# Patient Record
Sex: MALE | Race: WHITE | NOT HISPANIC OR LATINO | Employment: FULL TIME | URBAN - NONMETROPOLITAN AREA
[De-identification: names, ages, dates, MRNs, and addresses within clinical notes are randomized per-mention and may not be internally consistent; named-entity substitution may affect disease eponyms.]

---

## 2024-10-07 ENCOUNTER — OFFICE VISIT (OUTPATIENT)
Dept: URGENT CARE | Facility: CLINIC | Age: 39
End: 2024-10-07
Payer: COMMERCIAL

## 2024-10-07 ENCOUNTER — APPOINTMENT (OUTPATIENT)
Dept: RADIOLOGY | Facility: CLINIC | Age: 39
End: 2024-10-07
Payer: COMMERCIAL

## 2024-10-07 VITALS
SYSTOLIC BLOOD PRESSURE: 142 MMHG | DIASTOLIC BLOOD PRESSURE: 100 MMHG | RESPIRATION RATE: 18 BRPM | TEMPERATURE: 98.2 F | OXYGEN SATURATION: 95 % | HEART RATE: 79 BPM

## 2024-10-07 DIAGNOSIS — Z75.8 DOES NOT HAVE PRIMARY CARE PROVIDER: ICD-10-CM

## 2024-10-07 DIAGNOSIS — R06.2 WHEEZING: ICD-10-CM

## 2024-10-07 DIAGNOSIS — R06.2 WHEEZING: Primary | ICD-10-CM

## 2024-10-07 DIAGNOSIS — J20.9 ACUTE BRONCHITIS, UNSPECIFIED ORGANISM: ICD-10-CM

## 2024-10-07 PROCEDURE — 99203 OFFICE O/P NEW LOW 30 MIN: CPT

## 2024-10-07 PROCEDURE — 71046 X-RAY EXAM CHEST 2 VIEWS: CPT

## 2024-10-07 RX ORDER — ALBUTEROL SULFATE 90 UG/1
2 INHALANT RESPIRATORY (INHALATION) EVERY 6 HOURS PRN
Qty: 8.5 G | Refills: 0 | Status: SHIPPED | OUTPATIENT
Start: 2024-10-07

## 2024-10-07 RX ORDER — AZITHROMYCIN 250 MG/1
TABLET, FILM COATED ORAL
Qty: 6 TABLET | Refills: 0 | Status: SHIPPED | OUTPATIENT
Start: 2024-10-07 | End: 2024-10-11

## 2024-10-07 RX ORDER — METHYLPREDNISOLONE 4 MG
TABLET, DOSE PACK ORAL
Qty: 21 TABLET | Refills: 0 | Status: SHIPPED | OUTPATIENT
Start: 2024-10-07

## 2024-10-07 NOTE — PATIENT INSTRUCTIONS
"Take prescribed medication as instructed.  Tylenol for pain or fever.  Avoid NSAIDs while taking steroid pack (Medrol).  Make sure to stay well-hydrated and rest.  Warm tea with honey/teaspoon of honey may help with coughing irritation.  Follow-up with family doctor referral as discussed.  Make sure to brush teeth after inhaler use.  Follow up with PCP in 3-5 days.  Proceed to  ER if symptoms worsen.    If tests are performed, our office will contact you with results only if changes need to made to the care plan discussed with you at the visit. You can review your full results on [facility].  Patient Education     Acute bronchitis in adults   The Basics   Written by the doctors and editors at [facility]   What is bronchitis? -- This is an infection that causes a cough. It happens when the tubes that carry air into the lungs, called the \"bronchi,\" get infected (figure 1).  Usually, bronchitis happens after a person gets a cold or the flu. The viruses that cause the cold or flu infect the bronchi and irritate them. Antibiotics do not help bronchitis.  Bronchitis can also happen when a person gets an infection called \"whooping cough,\" but this is much less common. Whooping cough is caused by bacteria that can infect the bronchi. Most people get vaccines to prevent whooping cough, but the vaccine doesn't always work. Your doctor will be able to tell if you have whooping cough by doing an exam and listening to your cough.  This article is about \"acute\" bronchitis. This is different from \"chronic\" bronchitis, which is a lung disease that most often affects people who smoke.  What are the symptoms of bronchitis? -- The most common symptoms are:   A cough that can last up to a few weeks   Coughing up mucus that is clear, yellow, or green - Green mucus does not always mean that you have a bacterial infection.  You might also have other cold or flu symptoms, like a stuffy nose, sore throat, or headache. People with " bronchitis do not usually get a fever.  Will I need tests? -- Most people with bronchitis do not need a test. But if your doctor or nurse is not sure what is causing your cough, they might do tests. For example, they might order a chest X-ray. Or if they think that you might have COVID-19, they will test you for the virus that causes the infection.  How is bronchitis treated? -- Bronchitis almost always goes away on its own. But the cough can take up to 3 weeks to get better, and sometimes even longer.  Doctors do not usually treat bronchitis with antibiotic medicines. That's because bronchitis is usually caused by a virus, and antibiotics kill bacteria, not viruses. Also, antibiotics can actually cause other problems.  To feel better, you can treat your cold and flu symptoms. You can:   Get lots of rest, and drink plenty of liquids.   Drink hot tea.   Suck on cough drops or hard candy.   Take over-the-counter cough and cold medicines.   Breath in warm, moist air, such as in the shower, over a kettle, or from a humidifier.   Take a pain-relieving medicine if you have cold or flu symptoms like headache, muscle aches, or joint pain.  Avoid smoking or being around others who smoke. This can make your cough worse.  How can I keep from getting bronchitis again? -- You can reduce your chance of getting bronchitis again by keeping the germs that cause bronchitis out of your body. One of the best ways to do this is to wash your hands often with soap and water. If there is no sink nearby, you can use a hand gel with alcohol in it to clean your hands.  How can I keep from spreading germs? -- In addition to washing your hands often, cover your mouth with your elbow when you sneeze or cough. Using your elbow keeps you from getting germs on your hands. If you use a tissue, throw the tissue away and wash your hands.  When should I call the doctor? -- Call for advice if you have:   A fever higher than 100.4°F (38°C), or chills    "Chest pain when you cough, trouble breathing, or coughing up blood   A barking cough that makes it hard to talk   Cough and weight loss that you cannot explain   Symptoms that are not getting better after 3 weeks  All topics are updated as new evidence becomes available and our peer review process is complete.  This topic retrieved from [employer] on: May 16, 2024.  Topic 42076 Version 17.0  Release: 32.4.3 - C32.135  © 2024 UpToDate, Inc. and/or its affiliates. All rights reserved.  figure 1: Normal lungs     The lungs sit in the chest, inside the ribcage. They are covered with a thin membrane called the \"pleura.\" The windpipe, or trachea, branches into 2 smaller airways called the left and right \"bronchi.\" The space between the lungs is called the \"mediastinum.\" Lymph nodes are located within and around the lungs and mediastinum.  Graphic 22503 Version 14.0  Consumer Information Use and Disclaimer   Disclaimer: This generalized information is a limited summary of diagnosis, treatment, and/or medication information. It is not meant to be comprehensive and should be used as a tool to help the user understand and/or assess potential diagnostic and treatment options. It does NOT include all information about conditions, treatments, medications, side effects, or risks that may apply to a specific patient. It is not intended to be medical advice or a substitute for the medical advice, diagnosis, or treatment of a health care provider based on the health care provider's examination and assessment of a patient's specific and unique circumstances. Patients must speak with a health care provider for complete information about their health, medical questions, and treatment options, including any risks or benefits regarding use of medications. This information does not endorse any treatments or medications as safe, effective, or approved for treating a specific patient. UpToDate, Inc. and its affiliates disclaim any warranty or " liability relating to this information or the use thereof.The use of this information is governed by the Terms of Use, available at https://www.wolterskluwer.com/en/know/clinical-effectiveness-terms. 2024© UpToDate, Inc. and its affiliates and/or licensors. All rights reserved.  Copyright   © 2024 [employer] and/or its affiliates. All rights reserved.

## 2024-10-07 NOTE — PROGRESS NOTES
"  [facility]        NAME: Goldy Lopez is a 39 y.o. male  : 1985    MRN: [id number]  DATE: 2024  TIME: 9:56 AM    Assessment and Plan   Wheezing [R06.2]  1. Wheezing  XR chest pa and lateral    methylPREDNISolone 4 MG tablet therapy pack    albuterol (ProAir HFA) 90 mcg/act inhaler      2. Acute bronchitis, unspecified organism  methylPREDNISolone 4 MG tablet therapy pack    albuterol (ProAir HFA) 90 mcg/act inhaler    azithromycin (ZITHROMAX) 250 mg tablet      3. Does not have primary care provider  Ambulatory Referral to Family Practice        1 week of cough.  He is a smoker and had asthma as a child but not throughout his adult life.  Mild wheezing heard throughout.  No obvious infiltrate on x-ray-official read pending.  Given referral for family doctor, as he has not seen one in several years.  Sending in Medrol Dosepak, albuterol inhaler and Z-Sathish.  Advised follow-up with family doctor referral.  Advised to go to the emergency room if any symptoms worsen.    Patient Instructions     Take prescribed medication as instructed.  Tylenol for pain or fever.  Avoid NSAIDs while taking steroid pack (Medrol).  Make sure to stay well-hydrated and rest.  Warm tea with honey/teaspoon of honey may help with coughing irritation.  Follow-up with family doctor referral as discussed.  Make sure to brush teeth after inhaler use.  Follow up with PCP in 3-5 days.  Proceed to  ER if symptoms worsen.    If tests are performed, our office will contact you with results only if changes need to made to the care plan discussed with you at the visit. [facility].    Chief Complaint     Chief Complaint   Patient presents with    Cough     Onset fever last week today with cough and concerned he may have \"bronchitis\" has been using otc meds smoker         History of Present Illness       39-year-old male presents to the clinic with 1 week of cough.  States that it " started initially with a fever as well but that dissipated.  History of asthma when he was young child does not have any issues with it during his adult life and does not have an inhaler at home.  He is a current smoker.  He denies any chills, chest pain, shortness of breath, abdominal pain, vomiting, diarrhea, sore throat, earache, congestion.    Cough  Associated symptoms include a fever and wheezing. Pertinent negatives include no chills or shortness of breath.       Review of Systems   Review of Systems   Constitutional:  Positive for fever. Negative for appetite change, chills, diaphoresis and fatigue.   HENT: Negative.     Eyes: Negative.    Respiratory:  Positive for cough and wheezing. Negative for shortness of breath.    Cardiovascular: Negative.    Gastrointestinal: Negative.    Musculoskeletal: Negative.    Skin: Negative.    Neurological: Negative.          Current Medications       Current Outpatient Medications:     albuterol (ProAir HFA) 90 mcg/act inhaler, Inhale 2 puffs every 6 (six) hours as needed for wheezing, Disp: 8.5 g, Rfl: 0    azithromycin (ZITHROMAX) 250 mg tablet, Take 2 tablets today then 1 tablet daily x 4 days, Disp: 6 tablet, Rfl: 0    methylPREDNISolone 4 MG tablet therapy pack, Use as directed on package, Disp: 21 tablet, Rfl: 0    Current Allergies     Allergies as of 10/07/2024    (No Known Allergies)            The following portions of the patient's history were reviewed and updated as appropriate: allergies, current medications, past family history, past medical history, past social history, past surgical history and problem list.     History reviewed. No pertinent past medical history.    History reviewed. No pertinent surgical history.    No family history on file.      Medications have been verified.        Objective   /100   Pulse 79   Temp 98.2 °F (36.8 °C)   Resp 18   SpO2 95%        Physical Exam     Physical Exam  Constitutional:       General: He is awake. He  is not in acute distress.     Appearance: Normal appearance. He is not ill-appearing, toxic-appearing or diaphoretic.   HENT:      Head: Normocephalic and atraumatic.      Right Ear: Tympanic membrane, ear canal and external ear normal.      Left Ear: Tympanic membrane, ear canal and external ear normal.      Nose: Nose normal. No congestion or rhinorrhea.      Mouth/Throat:      Mouth: Mucous membranes are moist.      Pharynx: Oropharynx is clear. No oropharyngeal exudate or posterior oropharyngeal erythema.   Eyes:      Extraocular Movements: Extraocular movements intact.      Conjunctiva/sclera: Conjunctivae normal.      Pupils: Pupils are equal, round, and reactive to light.   Cardiovascular:      Rate and Rhythm: Normal rate and regular rhythm.      Pulses: Normal pulses.      Heart sounds: Normal heart sounds.   Pulmonary:      Effort: Pulmonary effort is normal. No tachypnea, bradypnea, accessory muscle usage, prolonged expiration or respiratory distress.      Breath sounds: No stridor or transmitted upper airway sounds. Wheezing (Diffuse expiratory wheeze heard bilaterally/throughout.) present. No rhonchi or rales.   Musculoskeletal:      Cervical back: Normal range of motion and neck supple. No rigidity or tenderness.   Lymphadenopathy:      Cervical: No cervical adenopathy.   Skin:     General: Skin is warm and dry.      Capillary Refill: Capillary refill takes less than 2 seconds.      Coloration: Skin is not pale.      Findings: No erythema or rash.   Neurological:      General: No focal deficit present.      Mental Status: He is alert, oriented to person, place, and time and easily aroused.   Psychiatric:         Mood and Affect: Mood normal.         Behavior: Behavior is cooperative.

## 2024-10-29 DIAGNOSIS — R06.2 WHEEZING: ICD-10-CM

## 2024-10-29 DIAGNOSIS — J20.9 ACUTE BRONCHITIS, UNSPECIFIED ORGANISM: ICD-10-CM

## 2024-12-28 RX ORDER — ALBUTEROL SULFATE 90 UG/1
INHALANT RESPIRATORY (INHALATION)
OUTPATIENT
Start: 2024-12-28